# Patient Record
Sex: FEMALE | Race: WHITE | Employment: FULL TIME | URBAN - METROPOLITAN AREA
[De-identification: names, ages, dates, MRNs, and addresses within clinical notes are randomized per-mention and may not be internally consistent; named-entity substitution may affect disease eponyms.]

---

## 2017-02-28 ENCOUNTER — HOSPITAL ENCOUNTER (OUTPATIENT)
Age: 27
Discharge: HOME | End: 2017-02-28
Payer: COMMERCIAL

## 2017-02-28 ENCOUNTER — APPOINTMENT (OUTPATIENT)
Dept: GENERAL RADIOLOGY | Age: 27
End: 2017-02-28
Attending: PHYSICIAN ASSISTANT
Payer: COMMERCIAL

## 2017-02-28 VITALS
OXYGEN SATURATION: 99 % | RESPIRATION RATE: 20 BRPM | BODY MASS INDEX: 34 KG/M2 | DIASTOLIC BLOOD PRESSURE: 80 MMHG | TEMPERATURE: 98 F | HEART RATE: 88 BPM | WEIGHT: 199.63 LBS | SYSTOLIC BLOOD PRESSURE: 141 MMHG

## 2017-02-28 DIAGNOSIS — J11.1 INFLUENZA: Primary | ICD-10-CM

## 2017-02-28 PROCEDURE — 71020 XR CHEST PA + LAT CHEST (CPT=71020): CPT

## 2017-02-28 PROCEDURE — 99204 OFFICE O/P NEW MOD 45 MIN: CPT

## 2017-02-28 PROCEDURE — 99213 OFFICE O/P EST LOW 20 MIN: CPT

## 2017-02-28 RX ORDER — CODEINE PHOSPHATE AND GUAIFENESIN 10; 100 MG/5ML; MG/5ML
10 SOLUTION ORAL EVERY 6 HOURS PRN
Qty: 180 ML | Refills: 0 | Status: SHIPPED | OUTPATIENT
Start: 2017-02-28 | End: 2017-03-10 | Stop reason: ALTCHOICE

## 2017-02-28 NOTE — ED PROVIDER NOTES
Patient Seen in: [facility] Immediate Care In [facility]    History   Patient presents with:  Flu    Stated Complaint: HEAD CONGESTION / FEVER / PLBSH X 5 DAYS    HPI    Penelope is a 48-year-old feamle who presents today for evaluation of cough, congestion 1125 None (Room air)       Current:/80 mmHg  Pulse 88  Temp(Src) 98 °F (36.7 °C) (Temporal)  Resp 20  Wt 90.538 kg  SpO2 99%  LMP 09/16/2016        Physical Exam   Constitutional: She is oriented to person, place, and time.  She appears well-developed Mason Schafer MD            [facility]   Clinical impression: Influenza  Plan: I reviewed the chest x-ray findings with the patient. I believe her symptoms to be viral in nature, possibly influenza. The patient is instructed on symptomatic relief.   She is instructed

## 2017-02-28 NOTE — ED INITIAL ASSESSMENT (HPI)
Pt began 5 days ago with cough, fever, congestion and dizziness.   Temp up to 100.7 , and pt continues to have severe body aches

## 2017-03-03 ENCOUNTER — HOSPITAL ENCOUNTER (OUTPATIENT)
Age: 27
Discharge: HOME | End: 2017-03-03
Payer: COMMERCIAL

## 2017-03-03 VITALS
TEMPERATURE: 98 F | OXYGEN SATURATION: 99 % | DIASTOLIC BLOOD PRESSURE: 86 MMHG | RESPIRATION RATE: 20 BRPM | SYSTOLIC BLOOD PRESSURE: 143 MMHG | HEART RATE: 86 BPM

## 2017-03-03 DIAGNOSIS — B34.9 NONSPECIFIC SYNDROME SUGGESTIVE OF VIRAL ILLNESS: Primary | ICD-10-CM

## 2017-03-03 LAB
#LYMPHOCYTE IC: 3.7 X10ˆ3/UL (ref 0.9–3.2)
#MXD IC: 0.8 X10ˆ3/UL (ref 0.1–1)
#NEUTROPHIL IC: 5.9 X10ˆ3/UL (ref 1.3–6.7)
GLUCOSE BLD-MCNC: 93 MG/DL (ref 65–99)
HCT IC: 46.3 % (ref 37–54)
HGB IC: 15 G/DL (ref 12–16)
LYMPHOCYTES NFR BLD AUTO: 35.8 %
MCH IC: 29.5 PG (ref 27–33.2)
MCHC IC: 32.4 G/DL (ref 31–37)
MCV IC: 91 FL (ref 81–100)
MIXED CELL %: 7.7 %
NEUTROPHILS NFR BLD AUTO: 56.5 %
PLT IC: 360 X10ˆ3/UL (ref 150–450)
POCT BILIRUBIN URINE: NEGATIVE
POCT BLOOD URINE: NEGATIVE
POCT GLUCOSE URINE: NEGATIVE MG/DL
POCT KETONE URINE: NEGATIVE MG/DL
POCT LEUKOCYTE ESTERASE URINE: NEGATIVE
POCT MONO: NEGATIVE
POCT NITRITE URINE: NEGATIVE
POCT PH URINE: 5.5 (ref 5–8)
POCT PROTEIN URINE: NEGATIVE MG/DL
POCT SPECIFIC GRAVITY URINE: 1.03
POCT URINE CLARITY: CLEAR
POCT URINE PREGNANCY: NEGATIVE
POCT UROBILINOGEN URINE: 1 MG/DL
RBC IC: 5.09 X10ˆ6/UL (ref 3.8–5.1)
WBC IC: 10.4 X10ˆ3/UL (ref 4–13)

## 2017-03-03 PROCEDURE — 85025 COMPLETE CBC W/AUTO DIFF WBC: CPT | Performed by: PHYSICIAN ASSISTANT

## 2017-03-03 PROCEDURE — 99213 OFFICE O/P EST LOW 20 MIN: CPT

## 2017-03-03 PROCEDURE — 81025 URINE PREGNANCY TEST: CPT | Performed by: PHYSICIAN ASSISTANT

## 2017-03-03 PROCEDURE — 81002 URINALYSIS NONAUTO W/O SCOPE: CPT | Performed by: PHYSICIAN ASSISTANT

## 2017-03-03 PROCEDURE — 86308 HETEROPHILE ANTIBODY SCREEN: CPT | Performed by: PHYSICIAN ASSISTANT

## 2017-03-03 PROCEDURE — 82962 GLUCOSE BLOOD TEST: CPT

## 2017-03-03 RX ORDER — IBUPROFEN 600 MG/1
600 TABLET ORAL ONCE
Status: COMPLETED | OUTPATIENT
Start: 2017-03-03 | End: 2017-03-03

## 2017-03-03 NOTE — ED INITIAL ASSESSMENT (HPI)
Here for recheck of flu symptoms that started about 5 days ago. Seen here for the same and given Ceritussin and taking Claritin D. Sts that her congestion has improved, but she is still sick.

## 2017-03-03 NOTE — ED PROVIDER NOTES
Patient Seen in: [facility] In [facility]    History   Patient presents with:  Recheck    Stated Complaint: flu-not getting any better    HPI    32 yo female here with c/o flu like symptoms x 1 week-was seen here and diagnosed with flu.  PT re 03/03/17 0932 None (Room air)       Current:/86 mmHg  Pulse 86  Temp(Src) 97.6 °F (36.4 °C) (Oral)  Resp 20  SpO2 99%  LMP 09/16/2016        Physical Exam   Constitutional: She is oriented to person, place, and time.  She appears well-developed and we discharge today. Disposition and Plan     Clinical Impression:  Nonspecific syndrome suggestive of viral illness  (primary encounter diagnosis)    Disposition:  Discharge    Follow-up:  MD Jessie Dan

## 2017-03-05 ENCOUNTER — HOSPITAL ENCOUNTER (OUTPATIENT)
Age: 27
Discharge: HOME | End: 2017-03-05
Payer: COMMERCIAL

## 2017-03-05 VITALS
RESPIRATION RATE: 16 BRPM | BODY MASS INDEX: 33 KG/M2 | HEART RATE: 75 BPM | SYSTOLIC BLOOD PRESSURE: 133 MMHG | WEIGHT: 195 LBS | TEMPERATURE: 98 F | DIASTOLIC BLOOD PRESSURE: 74 MMHG | OXYGEN SATURATION: 99 %

## 2017-03-05 DIAGNOSIS — Z00.00 WELL ADULT EXAM: Primary | ICD-10-CM

## 2017-03-05 PROCEDURE — 99211 OFF/OP EST MAY X REQ PHY/QHP: CPT

## 2017-03-05 NOTE — ED PROVIDER NOTES
Patient Seen in: THE [facility] In [place]    History   Patient presents with:  Note For Work  Follow - Up    Stated Complaint: FOLLOW UP/FLU    HPI    30 yo female here for a well screening examination after she was out from work with flu sym is oriented to person, place, and time. She appears well-developed and well-nourished. HENT:   Head: Normocephalic and atraumatic. Eyes: Conjunctivae and EOM are normal. Pupils are equal, round, and reactive to light. Neck: Normal range of motion.  Ne

## 2017-03-05 NOTE — ED INITIAL ASSESSMENT (HPI)
Pt. Was seen at [facility] in the past week or so. Has flu type symptoms. Is here to get a note to back to work, no restrictions.  Pt. States she feels much better, no fever at this time

## 2017-03-10 ENCOUNTER — HOSPITAL ENCOUNTER (OUTPATIENT)
Age: 27
Discharge: HOME | End: 2017-03-10
Payer: COMMERCIAL

## 2017-03-10 VITALS
RESPIRATION RATE: 16 BRPM | OXYGEN SATURATION: 100 % | DIASTOLIC BLOOD PRESSURE: 72 MMHG | SYSTOLIC BLOOD PRESSURE: 124 MMHG | TEMPERATURE: 98 F | HEART RATE: 62 BPM

## 2017-03-10 DIAGNOSIS — K52.9 GASTROENTERITIS: Primary | ICD-10-CM

## 2017-03-10 LAB
#LYMPHOCYTE IC: 2.7 X10ˆ3/UL (ref 0.9–3.2)
#MXD IC: 1 X10ˆ3/UL (ref 0.1–1)
#NEUTROPHIL IC: 4.6 X10ˆ3/UL (ref 1.3–6.7)
CREAT SERPL-MCNC: 0.7 MG/DL (ref 0.4–1)
GLUCOSE BLD-MCNC: 91 MG/DL (ref 65–99)
HCT IC: 40.1 % (ref 37–54)
HGB IC: 13.5 G/DL (ref 12–16)
ISTAT BLOOD GAS TCO2: 30 MMOL/L (ref 22–32)
ISTAT BUN: 11 MG/DL (ref 8–20)
ISTAT CHLORIDE: 106 MMOL/L (ref 101–111)
ISTAT HEMATOCRIT: 39 % (ref 37–54)
ISTAT IONIZED CALCIUM: 1.11 MMOL/L (ref 1.12–1.32)
ISTAT POTASSIUM: 4.4 MMOL/L (ref 3.6–5.1)
ISTAT SODIUM: 138 MMOL/L (ref 136–144)
LYMPHOCYTES NFR BLD AUTO: 33.1 %
MCH IC: 30.1 PG (ref 27–33.2)
MCHC IC: 33.7 G/DL (ref 31–37)
MCV IC: 89.3 FL (ref 81–100)
MIXED CELL %: 11.6 %
NEUTROPHILS NFR BLD AUTO: 55.3 %
PLT IC: 359 X10ˆ3/UL (ref 150–450)
POCT GLUCOSE URINE: NEGATIVE MG/DL
POCT HEMOCCULT: NEGATIVE
POCT KETONE URINE: NEGATIVE MG/DL
POCT LEUKOCYTE ESTERASE URINE: NEGATIVE
POCT NITRITE URINE: NEGATIVE
POCT PH URINE: 6 (ref 5–8)
POCT PROTEIN URINE: 30 MG/DL
POCT SPECIFIC GRAVITY URINE: 1.03
POCT URINE COLOR: YELLOW
POCT URINE PREGNANCY: NEGATIVE
POCT UROBILINOGEN URINE: 1 MG/DL
RBC IC: 4.49 X10ˆ6/UL (ref 3.8–5.1)
WBC IC: 8.3 X10ˆ3/UL (ref 4–13)

## 2017-03-10 PROCEDURE — 99214 OFFICE O/P EST MOD 30 MIN: CPT

## 2017-03-10 PROCEDURE — 80047 BASIC METABLC PNL IONIZED CA: CPT

## 2017-03-10 PROCEDURE — 36415 COLL VENOUS BLD VENIPUNCTURE: CPT

## 2017-03-10 PROCEDURE — 81002 URINALYSIS NONAUTO W/O SCOPE: CPT

## 2017-03-10 PROCEDURE — 99215 OFFICE O/P EST HI 40 MIN: CPT

## 2017-03-10 PROCEDURE — 81025 URINE PREGNANCY TEST: CPT

## 2017-03-10 PROCEDURE — 82272 OCCULT BLD FECES 1-3 TESTS: CPT

## 2017-03-10 PROCEDURE — 96360 HYDRATION IV INFUSION INIT: CPT

## 2017-03-10 PROCEDURE — 85025 COMPLETE CBC W/AUTO DIFF WBC: CPT | Performed by: PHYSICIAN ASSISTANT

## 2017-03-10 PROCEDURE — 82270 OCCULT BLOOD FECES: CPT | Performed by: PHYSICIAN ASSISTANT

## 2017-03-10 RX ORDER — ONDANSETRON 4 MG/1
4 TABLET, ORALLY DISINTEGRATING ORAL EVERY 4 HOURS PRN
Qty: 10 TABLET | Refills: 0 | Status: SHIPPED | OUTPATIENT
Start: 2017-03-10

## 2017-03-10 RX ORDER — ONDANSETRON 4 MG/1
4 TABLET, ORALLY DISINTEGRATING ORAL ONCE
Status: COMPLETED | OUTPATIENT
Start: 2017-03-10 | End: 2017-03-10

## 2017-03-10 RX ORDER — SODIUM CHLORIDE 9 MG/ML
1000 INJECTION, SOLUTION INTRAVENOUS ONCE
Status: DISCONTINUED | OUTPATIENT
Start: 2017-03-10 | End: 2017-03-10

## 2017-03-10 NOTE — ED NOTES
Patient tolerated [employer] and saltine crackers. Denies nausea, no vomiting at this time.

## 2017-03-10 NOTE — ED INITIAL ASSESSMENT (HPI)
C/o yesterday with upset stomach, nausea for  2 days and unable to eat. Denies vomiting. Denies abdominal pain at this time. Denies urinary symptoms. Diarrhea about 5-6 times since Wednesday night.

## 2017-03-10 NOTE — ED PROVIDER NOTES
Patient Seen in: [facility] In [facility]    History   Patient presents with:  Nausea  Diarrhea    Stated Complaint: 2 DAYS NAUSEA,UNABLE TO EAT    HPI    32 yo female here with c/o N x 2 day in tandem with loose stool x 4-5 days.  PT denies c and reactive to light. Neck: Normal range of motion. Neck supple. Cardiovascular: Normal rate, regular rhythm, normal heart sounds and intact distal pulses. Pulmonary/Chest: Effort normal and breath sounds normal.   Abdominal: Soft.  Bowel sounds are appointment as soon as possible for a visit  If symptoms worsen and/or for F/U      Medications Prescribed:  Current Discharge Medication List    START taking these medications    ondansetron 4 MG Oral Tablet Dispersible  Take 1 tablet (4 mg total) by satish

## 2017-03-13 ENCOUNTER — HOSPITAL ENCOUNTER (OUTPATIENT)
Age: 27
Discharge: HOME | End: 2017-03-13
Payer: COMMERCIAL

## 2017-03-13 VITALS
SYSTOLIC BLOOD PRESSURE: 128 MMHG | DIASTOLIC BLOOD PRESSURE: 68 MMHG | RESPIRATION RATE: 18 BRPM | HEIGHT: 64 IN | TEMPERATURE: 98 F | OXYGEN SATURATION: 99 % | HEART RATE: 86 BPM

## 2017-03-13 DIAGNOSIS — K52.9 GASTROENTERITIS: Primary | ICD-10-CM

## 2017-03-13 DIAGNOSIS — Z00.00 PERIODIC HEALTH ASSESSMENT, GENERAL SCREENING, ADULT: ICD-10-CM

## 2017-03-13 PROCEDURE — 99211 OFF/OP EST MAY X REQ PHY/QHP: CPT

## 2017-03-13 NOTE — ED PROVIDER NOTES
Patient Seen in: [facility] Immediate Care In [facility]    History   Patient presents with:  Nausea/Vomiting/Diarrhea (gastrointestinal)    Stated Complaint: diarrhea/  note for work    HPI    30 yo female here for evaluation and to return to work tomorrow She appears well-developed and well-nourished. HENT:   Head: Normocephalic. Eyes: Conjunctivae and EOM are normal. Pupils are equal, round, and reactive to light. Neck: Normal range of motion. Neck supple.    Cardiovascular: Normal rate, regular rhyth

## 2017-03-13 NOTE — ED INITIAL ASSESSMENT (HPI)
Patient states she needs a different note for work and she is still not feeling good. Patient states she was in on Friday.

## 2017-05-22 ENCOUNTER — APPOINTMENT (OUTPATIENT)
Dept: GENERAL RADIOLOGY | Age: 27
End: 2017-05-22
Attending: PHYSICIAN ASSISTANT
Payer: MEDICAID

## 2017-05-22 ENCOUNTER — HOSPITAL ENCOUNTER (OUTPATIENT)
Age: 27
Discharge: HOME | End: 2017-05-22
Payer: MEDICAID

## 2017-05-22 VITALS
OXYGEN SATURATION: 97 % | HEIGHT: 64 IN | DIASTOLIC BLOOD PRESSURE: 79 MMHG | RESPIRATION RATE: 20 BRPM | SYSTOLIC BLOOD PRESSURE: 161 MMHG | HEART RATE: 76 BPM | BODY MASS INDEX: 33.97 KG/M2 | WEIGHT: 199 LBS | TEMPERATURE: 98 F

## 2017-05-22 DIAGNOSIS — S92.901A FOOT FRACTURE, RIGHT, CLOSED, INITIAL ENCOUNTER: Primary | ICD-10-CM

## 2017-05-22 PROCEDURE — 29515 APPLICATION SHORT LEG SPLINT: CPT

## 2017-05-22 PROCEDURE — 99214 OFFICE O/P EST MOD 30 MIN: CPT

## 2017-05-22 PROCEDURE — 73630 X-RAY EXAM OF FOOT: CPT | Performed by: PHYSICIAN ASSISTANT

## 2017-05-22 RX ORDER — IBUPROFEN 600 MG/1
600 TABLET ORAL ONCE
Status: COMPLETED | OUTPATIENT
Start: 2017-05-22 | End: 2017-05-22

## 2017-05-22 NOTE — ED PROVIDER NOTES
Patient Seen in: [facility] In [address] [place] [address],[address]    History   Patient presents with:   Foot Pain    Stated Complaint: FOOT PAIN     HPI    CHIEF COMPLAINT: Right foot pain    HISTORY OF PRESENT ILLNESS: Patient is a 25-year-old female presents to the Resp 05/22/17 1806 20   Temp 05/22/17 1806 98.3 °F (36.8 °C)   Temp src 05/22/17 1806 Temporal   SpO2 05/22/17 1806 97 %   O2 Device 05/22/17 1806 None (Room air)       Current:/79 mmHg  Pulse 76  Temp(Src) 98.3 °F (36.8 °C) (Temporal)  Resp 20  Ht 1 The splint was applied by the  tech. After application of the splint I returned and re-examined the patient. The splint was adequately immobilizing the joint and distal to the splint the patient's circulation and sensation was intact.   Disposition and Pl

## 2017-05-22 NOTE — ED INITIAL ASSESSMENT (HPI)
Pt presents to the immediate care due to right foot pain. Pt states she was walking yesterday when she felt a pop on the top of her foot and then has had sharp pain since. Pt states she has felt that pop before but never was in this bad of pain.  Pt states

## 2017-05-23 NOTE — ED NOTES
Pt requests return to work tomorrow note.   Note provided (advised previously stated restrictions still apply.)

## 2017-05-23 NOTE — ED NOTES
Pt came in with work papers that need to be filled out to allow pt to work in a seated capacity. Pt given copy of filled out papers. Papers scanned in to chart.

## 2017-05-30 ENCOUNTER — APPOINTMENT (OUTPATIENT)
Dept: GENERAL RADIOLOGY | Age: 27
End: 2017-05-30
Attending: PHYSICIAN ASSISTANT
Payer: MEDICAID

## 2017-05-30 ENCOUNTER — HOSPITAL ENCOUNTER (OUTPATIENT)
Age: 27
Discharge: HOME | End: 2017-05-30
Payer: MEDICAID

## 2017-05-30 VITALS
HEIGHT: 64 IN | WEIGHT: 199 LBS | TEMPERATURE: 99 F | HEART RATE: 92 BPM | SYSTOLIC BLOOD PRESSURE: 125 MMHG | RESPIRATION RATE: 20 BRPM | OXYGEN SATURATION: 98 % | DIASTOLIC BLOOD PRESSURE: 80 MMHG | BODY MASS INDEX: 33.97 KG/M2

## 2017-05-30 DIAGNOSIS — R03.0 ELEVATED BLOOD-PRESSURE READING WITHOUT DIAGNOSIS OF HYPERTENSION: ICD-10-CM

## 2017-05-30 DIAGNOSIS — M79.671 FOOT PAIN, RIGHT: Primary | ICD-10-CM

## 2017-05-30 PROCEDURE — 73630 X-RAY EXAM OF FOOT: CPT | Performed by: PHYSICIAN ASSISTANT

## 2017-05-30 PROCEDURE — 99213 OFFICE O/P EST LOW 20 MIN: CPT

## 2017-05-30 NOTE — ED INITIAL ASSESSMENT (HPI)
Patient presents for a recheck of a foot fracture and a new right short posterior mold application. Patient states that she was evaluated at the immediate care on 5-22-17 and was diagnosed with right foot fracture.  Patient states that she is unable to get

## 2017-05-30 NOTE — ED PROVIDER NOTES
Patient Seen in: [facility] In [place]    History   Patient presents with:  Cast Splint Problem (musculoskeletal)    Stated Complaint: needs foot re-rap was here last monday    HPI    32 yo female here with c/o R foot pain-PT was diagnosed SpO2 05/30/17 1723 98 %   O2 Device 05/30/17 1723 None (Room air)       Current:/80 mmHg  Pulse 92  Temp(Src) 98.5 °F (36.9 °C) (Temporal)  Resp 20  Ht 162.6 cm (5' 4\")  Wt 90.266 kg  BMI 34.14 kg/m2  SpO2 98%  LMP 05/25/2017 (Exact Date)        Tracie 5/30/2017  PROCEDURE:  XR FOOT, COMPLETE (MIN 3 VIEWS), RIGHT (CPT=73630)  TECHNIQUE:  AP, oblique, and lateral views were obtained. COMPARISON:  PETE GIBBS FOOT, COMPLETE (MIN 3 VIEWS), RIGHT (CPT=73630), 5/22/2017, 18:34.   INDICATIONS:  needs foot 5/22/2017  CONCLUSION:  1. Curvilinear ossific density lateral to the level of the base of the cuboid, may represent avulsion injury, age is indeterminate, may represent a chronic process correlate with exam and history.  Appropriate orthopedic clinical fol

## 2017-06-14 PROBLEM — M79.671 RIGHT FOOT PAIN: Status: ACTIVE | Noted: 2017-06-14

## 2017-06-20 ENCOUNTER — HOSPITAL ENCOUNTER (OUTPATIENT)
Dept: MRI IMAGING | Facility: HOSPITAL | Age: 27
Discharge: HOME | End: 2017-06-20
Attending: ORTHOPAEDIC SURGERY
Payer: MEDICAID

## 2017-06-20 DIAGNOSIS — M79.671 RIGHT FOOT PAIN: ICD-10-CM

## 2017-06-20 PROCEDURE — 73718 MRI LOWER EXTREMITY W/O DYE: CPT | Performed by: ORTHOPAEDIC SURGERY

## 2017-09-06 NOTE — LETTER
[facility] CARE IN [place]  [address] [address]  Dept: [phone]  Dept Fax: [phone]      February 28, 2017    Patient: Tricia Valencia   Date of Visit: 2/28/2017       To Whom It May Concern:    Amy Reyes was se
no

## 2017-11-20 ENCOUNTER — APPOINTMENT (OUTPATIENT)
Dept: GENERAL RADIOLOGY | Facility: HOSPITAL | Age: 27
End: 2017-11-20
Payer: MEDICAID

## 2017-11-20 ENCOUNTER — HOSPITAL ENCOUNTER (EMERGENCY)
Facility: HOSPITAL | Age: 27
Discharge: HOME | End: 2017-11-20
Payer: MEDICAID

## 2017-11-20 VITALS
TEMPERATURE: 97 F | HEART RATE: 78 BPM | SYSTOLIC BLOOD PRESSURE: 118 MMHG | DIASTOLIC BLOOD PRESSURE: 74 MMHG | RESPIRATION RATE: 18 BRPM

## 2017-11-20 DIAGNOSIS — M77.51 OS PERONEUM SYNDROME OF RIGHT FOOT: ICD-10-CM

## 2017-11-20 DIAGNOSIS — M79.671 FOOT PAIN, RIGHT: Primary | ICD-10-CM

## 2017-11-20 PROCEDURE — 29515 APPLICATION SHORT LEG SPLINT: CPT

## 2017-11-20 PROCEDURE — 99283 EMERGENCY DEPT VISIT LOW MDM: CPT

## 2017-11-20 PROCEDURE — 73630 X-RAY EXAM OF FOOT: CPT

## 2017-11-20 RX ORDER — IBUPROFEN 600 MG/1
600 TABLET ORAL ONCE
Status: COMPLETED | OUTPATIENT
Start: 2017-11-20 | End: 2017-11-20

## 2017-11-20 NOTE — ED PROVIDER NOTES
Patient Seen in: [facility] Emergency Department    History   Patient presents with:  Lower Extremity Injury (musculoskeletal)    Stated Complaint: r foot pain for 2 days    HPI    Patient presents with chronic right foot pain worse over the last fe Speaking in full sentences without increased work of breathing      Extremities nonedematous      Right lower extremity from the knee down completely nontender throughout with unremarkable soft tissue exam with no swelling redness or discomfort except week  For office consultation        Medications Prescribed:  Discharge Medication List as of 11/20/2017  3:42 AM

## 2017-11-25 ENCOUNTER — APPOINTMENT (OUTPATIENT)
Dept: ULTRASOUND IMAGING | Age: 27
End: 2017-11-25
Attending: FAMILY MEDICINE
Payer: MEDICAID

## 2017-11-25 ENCOUNTER — HOSPITAL ENCOUNTER (OUTPATIENT)
Age: 27
Discharge: HOME | End: 2017-11-25
Attending: FAMILY MEDICINE
Payer: MEDICAID

## 2017-11-25 VITALS
TEMPERATURE: 98 F | DIASTOLIC BLOOD PRESSURE: 71 MMHG | SYSTOLIC BLOOD PRESSURE: 126 MMHG | OXYGEN SATURATION: 96 % | RESPIRATION RATE: 16 BRPM | HEART RATE: 79 BPM

## 2017-11-25 DIAGNOSIS — L03.115 CELLULITIS OF RIGHT LOWER EXTREMITY: Primary | ICD-10-CM

## 2017-11-25 PROCEDURE — 99214 OFFICE O/P EST MOD 30 MIN: CPT

## 2017-11-25 PROCEDURE — 93971 EXTREMITY STUDY: CPT | Performed by: FAMILY MEDICINE

## 2017-11-25 RX ORDER — SULFAMETHOXAZOLE AND TRIMETHOPRIM 800; 160 MG/1; MG/1
1 TABLET ORAL 2 TIMES DAILY
Qty: 20 TABLET | Refills: 0 | Status: SHIPPED | OUTPATIENT
Start: 2017-11-25 | End: 2017-12-05

## 2017-11-25 RX ORDER — SULFAMETHOXAZOLE AND TRIMETHOPRIM 800; 160 MG/1; MG/1
1 TABLET ORAL 2 TIMES DAILY
Qty: 20 TABLET | Refills: 0 | Status: SHIPPED | OUTPATIENT
Start: 2017-11-25 | End: 2017-11-25

## 2017-11-25 RX ORDER — BUSPIRONE HYDROCHLORIDE 15 MG/1
15 TABLET ORAL 3 TIMES DAILY
COMMUNITY

## 2017-11-25 NOTE — ED INITIAL ASSESSMENT (HPI)
Patient presents with complaints of a \" bump\" to the right lower anterior aspect of the lower leg onset today. Denies any injury. Patient was in the ED on Sunday for foot pain and was placed in a short leg posterior mold.  Patient did take off the posteri

## 2017-11-26 NOTE — ED PROVIDER NOTES
Patient Seen in: [facility] In [facility]    History   Patient presents with:  Bump    Stated Complaint: LUMP/BUMP ON RT LEG ABOVE ANKLE    HPI    28year old female presents for painful lump on the right leg.  Patient states she was seen in t She is alert and oriented to person, place, and time. Skin: Skin is warm and dry.            ED Course   Labs Reviewed - No data to display    ED Course as of Nov 25 2148  ------------------------------------------------------------     MDM     Right lowe

## (undated) NOTE — LETTER
[facility] CARE IN [place]  [address] [address]  Dept: [phone]  Dept Fax: [phone]      March 3, 2017    Patient: Merlene Page   Date of Visit: 3/3/2017       To Whom It May Concern:    Stewart Bocanegra was seen an

## (undated) NOTE — LETTER
[facility] CARE IN [place]  [address] [address]  Dept: [phone]  Dept Fax: [phone]      March 5, 2017    Patient: Love Castleman   Date of Visit: 3/5/2017       To Whom It May Concern:    Lucila Zamudio was seen an

## (undated) NOTE — ED AVS SNAPSHOT
[facility] in [address] [place] [address] Drive,4Th Floor    [address] [facility]    Phone:  [phone]    Fax:  Dez   MRN: [id number]    Department:  [facility] Immediate Care in [facility] END   Date of Visit:  5/22/2017 SPRAINS AND FRACTURES: FIRST AID (ENGLISH)      Disclosure     Insurance plans vary and the physician(s) referred by the Immediate Care may not be covered by your plan.  Please contact your insurance company to determine coverage for follow-up care and ref CARE PHYSICIAN AT ONCE OR GO TO THE EMERGENCY DEPARTMENT. If you have been prescribed any medication(s), please fill your prescription right away and begin taking the medication(s) as directed.     If the Immediate Care Provider has read X-rays, these wi coverage. Patient [address] is a Federal Navigator program that can help with your Affordable Care Act coverage, as well as all types of Medicaid plans.   To get signed up and covered, please call [phone] and ask to get set up for an insuran Enter your [employer] Activation Code exactly as it appears below along with your Zip Code and Date of Birth to complete the sign-up process. If you do not sign up before the expiration date, you must request a new code.     Your unique [employer] Access Code: [email or web address]

## (undated) NOTE — LETTER
[facility] CARE IN [place] [id number] [address]  Dept: [phone]  Dept Fax: [phone]      March 10, 2017    Patient: Jase Pena   Date of Visit: 3/10/2017       To Whom It May Concern:    Gibson Blackburn was seen

## (undated) NOTE — ED AVS SNAPSHOT
[facility] in [address]    600 [facility]    Phone:  [phone]    Fax:  Dez   MRN: [id number]    Department:  [facility] Immediate Care in [address] [place] [address],7Th Floor   Date of Visit:  3/5/2017 Sandeep Magana [address], [place] ProcLorelei Gomez Jeovany 1   [phone]       To Check ER Wait Times:  TEXT 'ERwait' to 48921      Click [email or web address]      Or call [phone]    If you have any problems with your follow-up, please call our  at [phone] I have read and understand the instructions given to me by my caregivers. 24-Hour Pharmacies        Pharmacy Address Phone Number   [facility] [address] [facility] ([address]) [address] [place] [address] 289.  [facility] & visit,  view other health information, and more. To sign up or find more information, go to [email or web address] and click on the Sign Up Now link in the [employer] box.      Enter your Group Therapy Records Activation Code exactly as it appears below along with yo

## (undated) NOTE — ED AVS SNAPSHOT
[facility] in [address] [place] [address] Drive,4Th Floor    [address] [place] [address]    Phone:  [phone]    Fax:  Dez   MRN: [id number]    Department:  [facility] Immediate Care in [facility]   Date of Visit:  5/30/2017 may not be covered by your plan. Please contact your insurance company to determine coverage for follow-up care and referrals. [facility]  [address] [facility], [address] [place] [address]  [phone] [place] [address]  [address]  Na prescription right away and begin taking the medication(s) as directed. If the Immediate Care Provider has read X-rays, these will be re-interpreted by a radiologist.  If there is a significant change in your reading, you will be contacted.  Please make Medicaid plans. To get signed up and covered, please call [phone] and ask to get set up for an insurance coverage that is in-network with Adarsh Villatoro.         Imaging Results         XR FOOT, COMPLETE (MIN 3 VIEWS), RIGHT (CPT=73630) (Fin If you have questions, you can call [phone] to talk to our [facility] Staff. Remember, [employer] is NOT to be used for urgent needs. For medical emergencies, dial 911.

## (undated) NOTE — LETTER
[facility] CARE IN [place] [id number] [address]  Dept: [phone]  Dept Fax: [phone]      March 13, 2017    Patient: Alicia Shannon   Date of Visit: 3/13/2017       To Whom It May Concern:    Chan Bustamante was seen

## (undated) NOTE — ED AVS SNAPSHOT
[facility] in [address] [place] [address],4Th Floor    600 [facility]    Phone:  [phone]    Fax:  Dez   MRN: [id number]    Department:  [facility] in [facility] END   Date of Visit:  3/10/2017 VOMITING OR DIARRHEA (ADULT), DIET FOR (ENGLISH)    GASTROENTERITIS, NONINFECTIOUS (ENGLISH)      Disclosure     Insurance plans vary and the physician(s) referred by the Immediate Care may not be covered by your plan.  Please contact your insurance compan IF THERE IS ANY CHANGE OR WORSENING OF YOUR CONDITION, CALL YOUR PRIMARY CARE PHYSICIAN AT ONCE OR GO TO THE EMERGENCY DEPARTMENT.     If you have been prescribed any medication(s), please fill your prescription right away and begin taking the medication(s) Patient [address] to help you get signed up for insurance coverage. Patient [address] is a Federal Navigator program that can help with your Affordable Care Act coverage, as well as all types of Medicaid plans.   To get signed up and covere

## (undated) NOTE — ED AVS SNAPSHOT
[facility] in [address] [place] [address] Drive,4Th Floor    [address] [place] [address]    Phone:  [phone]    Fax:  Dez   MRN: [id number]    Department:  [facility] Immediate Care in [facility]   Date of Visit:  2/28/2017 [phone] [address] [facility], [address]  [phone] [address] [place] [address] Jason Thayer 1   [phone]       To Check ER Wait Times:  TEXT 'Nadine Rolle' to 10710      Click [email or web address] reading, you will be contacted. Please make sure we have your correct phone number before you leave. After you leave, you should follow the attached instructions. I have read and understand the instructions given to me by my caregivers.         24-Hour XR CHEST PA + LAT CHEST (CPT=71020) (Final result) Result time:  02/28/17 12:41:31    Final result    Impression:    CONCLUSION:  No active cardiopulmonary process identified. Dictated by:  Calderon Brower MD on 2/28/2017 at 12:41       Approved b

## (undated) NOTE — ED AVS SNAPSHOT
Mayank Villanueva   MRN: [id number]    Department:  [facility] Emergency Department   Date of Visit:  11/20/2017           Disclosure     Insurance plans vary and the physician(s) referred by the ER may not be covered by your plan.  Please contact If you have been prescribed any medication(s), please fill your prescription right away and begin taking the medication(s) as directed    If the emergency physician has read X-rays, these will be re-interpreted by a radiologist.  If there is a significant

## (undated) NOTE — LETTER
[facility] CARE IN [place]  [address] [address]  Dept: [phone]  Dept Fax: [phone]      May 22, 2017    Patient: Mayank Villanueva   Date of Visit: 5/22/2017       To Whom It May Concern:    Jaren Forde was seen an

## (undated) NOTE — LETTER
[facility] CARE IN [place]  [address] [address]  Dept: [phone]  Dept Fax: [phone]      May 23, 2017    Patient: Lata Soto   Date of Visit: 5/22/2017       To Whom It May Concern:    Keshawn Guzman was seen an

## (undated) NOTE — ED AVS SNAPSHOT
[facility] in [address] [place] [address] Drive,4Th Floor    [address] [place] [address]    Phone:  [phone]    Fax:  Dez   MRN: [id number]    Department:  [facility] Immediate Care in [facility]   Date of Visit:  3/13/2017 may not be covered by your plan. Please contact your insurance company to determine coverage for follow-up care and referrals. [facility] Care  [address] [facility], [address] [place] [address]  [phone] [place] [address]  [address]  Na prescription right away and begin taking the medication(s) as directed. If the Immediate Care Provider has read X-rays, these will be re-interpreted by a radiologist.  If there is a significant change in your reading, you will be contacted.  Please make Medicaid plans. To get signed up and covered, please call [phone] and ask to get set up for an insurance coverage that is in-network with Ramanmojd Villatoro. Alfonso     Sign up for [employer], your secure online medical record.   [place]

## (undated) NOTE — ED AVS SNAPSHOT
[facility] in [address]    [address] [place] [address]    Phone:  [phone]    Fax:  Dez   MRN: [id number]    Department:  [facility] Immediate Care in [address] [place] [address],7Th Floor   Date of Visit:  3/3/2017 Insurance plans vary and the physician(s) referred by the Immediate Care may not be covered by your plan. Please contact your insurance company to determine coverage for follow-up care and referrals. [facility]  [address]  Teena [place]le If you have been prescribed any medication(s), please fill your prescription right away and begin taking the medication(s) as directed.     If the Immediate Care Provider has read X-rays, these will be re-interpreted by a radiologist.  If there is a signifi can help with your Affordable Care Act coverage, as well as all types of Medicaid plans. To get signed up and covered, please call [phone] and ask to get set up for an insurance coverage that is in-network with Adarsh Laura